# Patient Record
Sex: MALE | Race: WHITE | URBAN - METROPOLITAN AREA
[De-identification: names, ages, dates, MRNs, and addresses within clinical notes are randomized per-mention and may not be internally consistent; named-entity substitution may affect disease eponyms.]

---

## 2020-09-28 ENCOUNTER — OFFICE VISIT (OUTPATIENT)
Dept: FAMILY MEDICINE CLINIC | Age: 65
End: 2020-09-28
Payer: COMMERCIAL

## 2020-09-28 VITALS
OXYGEN SATURATION: 97 % | SYSTOLIC BLOOD PRESSURE: 144 MMHG | HEART RATE: 85 BPM | WEIGHT: 229.8 LBS | BODY MASS INDEX: 30.46 KG/M2 | HEIGHT: 73 IN | TEMPERATURE: 97.1 F | DIASTOLIC BLOOD PRESSURE: 98 MMHG

## 2020-09-28 PROBLEM — Z23 NEED FOR PNEUMOCOCCAL VACCINATION: Status: ACTIVE | Noted: 2020-09-28

## 2020-09-28 PROBLEM — Z23 NEED FOR INFLUENZA VACCINATION: Status: ACTIVE | Noted: 2020-09-28

## 2020-09-28 PROBLEM — I20.89 STABLE ANGINA: Status: ACTIVE | Noted: 2020-09-28

## 2020-09-28 PROBLEM — M72.0 DUPUYTREN'S CONTRACTURE: Status: ACTIVE | Noted: 2020-09-28

## 2020-09-28 PROBLEM — I10 ESSENTIAL HYPERTENSION: Status: ACTIVE | Noted: 2020-09-28

## 2020-09-28 PROBLEM — N52.9 ERECTILE DYSFUNCTION: Status: ACTIVE | Noted: 2020-09-28

## 2020-09-28 PROBLEM — L30.9 ECZEMA: Status: ACTIVE | Noted: 2020-09-28

## 2020-09-28 PROBLEM — I20.8 STABLE ANGINA (HCC): Status: ACTIVE | Noted: 2020-09-28

## 2020-09-28 PROBLEM — Z95.5 HISTORY OF HEART ARTERY STENT: Status: ACTIVE | Noted: 2020-09-28

## 2020-09-28 PROCEDURE — G0009 ADMIN PNEUMOCOCCAL VACCINE: HCPCS | Performed by: NURSE PRACTITIONER

## 2020-09-28 PROCEDURE — G8417 CALC BMI ABV UP PARAM F/U: HCPCS | Performed by: NURSE PRACTITIONER

## 2020-09-28 PROCEDURE — 1123F ACP DISCUSS/DSCN MKR DOCD: CPT | Performed by: NURSE PRACTITIONER

## 2020-09-28 PROCEDURE — 99204 OFFICE O/P NEW MOD 45 MIN: CPT | Performed by: NURSE PRACTITIONER

## 2020-09-28 PROCEDURE — 3017F COLORECTAL CA SCREEN DOC REV: CPT | Performed by: NURSE PRACTITIONER

## 2020-09-28 PROCEDURE — G0008 ADMIN INFLUENZA VIRUS VAC: HCPCS | Performed by: NURSE PRACTITIONER

## 2020-09-28 PROCEDURE — 4040F PNEUMOC VAC/ADMIN/RCVD: CPT | Performed by: NURSE PRACTITIONER

## 2020-09-28 PROCEDURE — G8427 DOCREV CUR MEDS BY ELIG CLIN: HCPCS | Performed by: NURSE PRACTITIONER

## 2020-09-28 PROCEDURE — 90694 VACC AIIV4 NO PRSRV 0.5ML IM: CPT | Performed by: NURSE PRACTITIONER

## 2020-09-28 PROCEDURE — 1036F TOBACCO NON-USER: CPT | Performed by: NURSE PRACTITIONER

## 2020-09-28 PROCEDURE — 90732 PPSV23 VACC 2 YRS+ SUBQ/IM: CPT | Performed by: NURSE PRACTITIONER

## 2020-09-28 RX ORDER — NITROGLYCERIN 0.3 MG/1
0.3 TABLET SUBLINGUAL EVERY 5 MIN PRN
COMMUNITY

## 2020-09-28 RX ORDER — LISINOPRIL 20 MG/1
20 TABLET ORAL DAILY
Qty: 90 TABLET | Refills: 0 | Status: SHIPPED | OUTPATIENT
Start: 2020-09-28 | End: 2020-12-28

## 2020-09-28 RX ORDER — ASPIRIN 81 MG/1
81 TABLET ORAL DAILY
COMMUNITY

## 2020-09-28 RX ORDER — LISINOPRIL 20 MG/1
20 TABLET ORAL DAILY
COMMUNITY
End: 2020-09-28 | Stop reason: SDUPTHER

## 2020-09-28 RX ORDER — SILDENAFIL CITRATE 20 MG/1
20 TABLET ORAL PRN
COMMUNITY

## 2020-09-28 SDOH — HEALTH STABILITY: MENTAL HEALTH: HOW MANY STANDARD DRINKS CONTAINING ALCOHOL DO YOU HAVE ON A TYPICAL DAY?: 1 OR 2

## 2020-09-28 SDOH — HEALTH STABILITY: MENTAL HEALTH: HOW OFTEN DO YOU HAVE A DRINK CONTAINING ALCOHOL?: 2-3 TIMES A WEEK

## 2020-09-28 ASSESSMENT — PATIENT HEALTH QUESTIONNAIRE - PHQ9
2. FEELING DOWN, DEPRESSED OR HOPELESS: 0
SUM OF ALL RESPONSES TO PHQ QUESTIONS 1-9: 0
1. LITTLE INTEREST OR PLEASURE IN DOING THINGS: 0
SUM OF ALL RESPONSES TO PHQ QUESTIONS 1-9: 0
SUM OF ALL RESPONSES TO PHQ9 QUESTIONS 1 & 2: 0

## 2020-09-28 ASSESSMENT — ENCOUNTER SYMPTOMS
VOMITING: 0
NAUSEA: 0
BLOOD IN STOOL: 0
ROS SKIN COMMENTS: ECZEMA
BACK PAIN: 0
SHORTNESS OF BREATH: 0
CONSTIPATION: 0
ABDOMINAL PAIN: 0
DIARRHEA: 0
COUGH: 0

## 2020-09-28 NOTE — PROGRESS NOTES
2020     Jennifer Gómez (:  1955) is a 77 y.o. male, here for evaluation of the following medical concerns:      Presents to establish care. Hypertension-taking lisinopril Zestril. Blood pressure borderline 140/90 at home    In  he had 99% blockage of left main artery and had a heart stent placed. Has not had any issues since, but states he   Gets chest pains once per week with or without exertion. Lasts 30 secs at the most and resolves on its own. States his previous cardiologist was aware of this. Does not have a cardiologist here in [facility] yet. Has nitro if he needs it at home. No dizziness, dyspnea, arm pain, palpitations, headaches, back pain. Does not take statins due to muscle cramping and pain    Erectile dysfunction, taking sildenafil without side effects    dupuytren contracture of right hand, chronic and unchanging  Still has range of motion    Diagnosis of eczema. Reports a red itchy rash on his chest that he has had for years. Uses triamcinolone, which helps. Colonoscopy normal within the last 10 years  PSA normal within the last 5 years  Needs a pneumonia vaccine and flu vaccine    He thinks that he wants to be a DNR CC and wants paperwork filled out today        Review of Systems   Constitutional: Negative for chills, diaphoresis, fatigue and fever. Eyes: Negative for visual disturbance. Respiratory: Negative for cough and shortness of breath. Cardiovascular: Positive for chest pain (once per week. ). Negative for palpitations and leg swelling. Gastrointestinal: Negative for abdominal pain, blood in stool, constipation, diarrhea, nausea and vomiting. Genitourinary: Negative for difficulty urinating. Musculoskeletal: Positive for arthralgias. Negative for back pain and gait problem. Dupuytren right hand    Skin:        eczema   Neurological: Negative for dizziness, weakness, light-headedness, numbness and headaches.    Psychiatric/Behavioral: Negative for decreased concentration, sleep disturbance and suicidal ideas. The patient is not nervous/anxious. Prior to Visit Medications    Medication Sig Taking? Authorizing Provider   sildenafil (REVATIO) 20 MG tablet Take 20 mg by mouth as needed Yes Historical Provider, MD   aspirin 81 MG EC tablet Take 81 mg by mouth daily Yes Historical Provider, MD   triamcinolone (KENALOG) 0.1 % ointment Apply topically 2 times daily Apply topically 2 times daily. Yes Historical Provider, MD   lisinopril (PRINIVIL;ZESTRIL) 20 MG tablet Take 1 tablet by mouth daily Yes Eleni Michelle APRN - NP        Social History     Tobacco Use    Smoking status: Never Smoker    Smokeless tobacco: Never Used   Substance Use Topics    Alcohol use: Yes     Alcohol/week: 1.0 standard drinks     Types: 1 Glasses of wine per week     Frequency: 2-3 times a week     Drinks per session: 1 or 2     Comment: one glass of wine per night         Vitals:    09/28/20 0927   BP: (!) 144/98   Site: Left Upper Arm   Position: Sitting   Cuff Size: Large Adult   Pulse: 85   Temp: 97.1 °F (36.2 °C)   SpO2: 97%   Weight: 229 lb 12.8 oz (104.2 kg)   Height: 6' 1\" (1.854 m)     Estimated body mass index is 30.32 kg/m² as calculated from the following:    Height as of this encounter: 6' 1\" (1.854 m). Weight as of this encounter: 229 lb 12.8 oz (104.2 kg). Physical Exam  Vitals signs reviewed. Constitutional:       General: He is not in acute distress. Appearance: Normal appearance. He is not ill-appearing, toxic-appearing or diaphoretic. HENT:      Head: Normocephalic and atraumatic. Nose: Nose normal.   Eyes:      Extraocular Movements: Extraocular movements intact. Pupils: Pupils are equal, round, and reactive to light. Neck:      Musculoskeletal: Normal range of motion and neck supple. Cardiovascular:      Rate and Rhythm: Normal rate and regular rhythm. Heart sounds: Normal heart sounds.    Pulmonary: Effort: Pulmonary effort is normal. No respiratory distress. Breath sounds: Normal breath sounds. Abdominal:      General: Bowel sounds are normal. There is no distension. Palpations: Abdomen is soft. There is no mass. Tenderness: There is no abdominal tenderness. Hernia: No hernia is present. Musculoskeletal: Normal range of motion. Skin:     General: Skin is warm and dry. Neurological:      General: No focal deficit present. Mental Status: He is alert and oriented to person, place, and time. Mental status is at baseline. Motor: No weakness. Gait: Gait normal.   Psychiatric:         Mood and Affect: Mood normal.         Behavior: Behavior normal.         Thought Content: Thought content normal.         Judgment: Judgment normal.         ASSESSMENT/PLAN:  1. Essential hypertension  Continue current medications. He states blood pressure at home is mid 130s to low 140s. Discussed checking his blood pressure 1 to 2 hours after medication daily and calling next week to follow-up with BP readings. May need to increase blood pressure medicine. DASH diet   - Comprehensive Metabolic Panel; [facility]  [facility]    2. History of heart artery stent  Allergy to statins and metoprolol  [facility]    3. Stable angina [facility])  He states this is minor and he is not concerned. Previous cardiologist is aware of this. I advised him it would be a good idea to have a cardiologist here in [facility]. [facility]    4. Erectile dysfunction, unspecified erectile dysfunction type  Sildenafil as needed. Advised of side effects, low blood pressure. 5. Dupuytren's contracture  Continue to monitor. No current intervention. ROM exercises    6. Eczema, unspecified type  Continue triamcinolone cream as needed.   Advised to apply emollient lotions as soon as he gets out of the shower before he is dry. Avoid drying agents such as soap and hot water, bleach, sun, chlorine    7. Need for influenza vaccination  Vaccine administered  8. Need for pneumococcal vaccination  Vaccine administered    9. Mixed hyperlipidemia  Is unable to take statins due to muscle cramps and pain. Will discuss cholesterol medication after lipid panel results and have cardiology input.  - Lipid Panel; Future  - Dong Shepard MD, Cardiology, [facility]        Patient education given on DNR status. He and his wife will review and return to office with their decision and paperwork to be completed      Present to the ER for any emergent or acute symptoms not managed at home or in office. Please note that this chart was generated using dragon dictation software. Although every effort was made to ensure the accuracy of this automated transcription, some errors in transcription may have occurred. Return in about 6 months (around 3/28/2021) for htn, htn check. An electronic signature was used to authenticate this note.     --AURELIO Zaidi - NP on 9/28/2020 at 5:37 PM

## 2020-10-01 DIAGNOSIS — E78.2 MIXED HYPERLIPIDEMIA: ICD-10-CM

## 2020-10-01 DIAGNOSIS — I10 ESSENTIAL HYPERTENSION: ICD-10-CM

## 2020-10-01 LAB
A/G RATIO: 1.7 (ref 1.1–2.2)
ALBUMIN SERPL-MCNC: 4.2 G/DL (ref 3.4–5)
ALP BLD-CCNC: 84 U/L (ref 40–129)
ALT SERPL-CCNC: 23 U/L (ref 10–40)
ANION GAP SERPL CALCULATED.3IONS-SCNC: 12 MMOL/L (ref 3–16)
AST SERPL-CCNC: 21 U/L (ref 15–37)
BILIRUB SERPL-MCNC: 0.5 MG/DL (ref 0–1)
BUN BLDV-MCNC: 12 MG/DL (ref 7–20)
CALCIUM SERPL-MCNC: 9 MG/DL (ref 8.3–10.6)
CHLORIDE BLD-SCNC: 104 MMOL/L (ref 99–110)
CHOLESTEROL, TOTAL: 220 MG/DL (ref 0–199)
CO2: 26 MMOL/L (ref 21–32)
CREAT SERPL-MCNC: 0.9 MG/DL (ref 0.8–1.3)
GFR AFRICAN AMERICAN: >60
GFR NON-AFRICAN AMERICAN: >60
GLOBULIN: 2.5 G/DL
GLUCOSE BLD-MCNC: 94 MG/DL (ref 70–99)
HDLC SERPL-MCNC: 34 MG/DL (ref 40–60)
LDL CHOLESTEROL CALCULATED: 139 MG/DL
POTASSIUM SERPL-SCNC: 4.1 MMOL/L (ref 3.5–5.1)
SODIUM BLD-SCNC: 142 MMOL/L (ref 136–145)
TOTAL PROTEIN: 6.7 G/DL (ref 6.4–8.2)
TRIGL SERPL-MCNC: 233 MG/DL (ref 0–150)
VLDLC SERPL CALC-MCNC: 47 MG/DL

## 2020-10-08 ENCOUNTER — INITIAL CONSULT (OUTPATIENT)
Dept: CARDIOLOGY CLINIC | Age: 65
End: 2020-10-08
Payer: COMMERCIAL

## 2020-10-08 ENCOUNTER — TELEPHONE (OUTPATIENT)
Dept: CARDIOLOGY CLINIC | Age: 65
End: 2020-10-08

## 2020-10-08 ENCOUNTER — PROCEDURE VISIT (OUTPATIENT)
Dept: CARDIOLOGY CLINIC | Age: 65
End: 2020-10-08
Payer: COMMERCIAL

## 2020-10-08 VITALS
WEIGHT: 230.8 LBS | HEIGHT: 73 IN | SYSTOLIC BLOOD PRESSURE: 124 MMHG | HEART RATE: 73 BPM | BODY MASS INDEX: 30.59 KG/M2 | DIASTOLIC BLOOD PRESSURE: 82 MMHG

## 2020-10-08 PROBLEM — I25.119 CORONARY ARTERY DISEASE INVOLVING NATIVE CORONARY ARTERY OF NATIVE HEART WITH ANGINA PECTORIS (HCC): Status: ACTIVE | Noted: 2020-10-08

## 2020-10-08 PROBLEM — E78.5 DYSLIPIDEMIA: Status: ACTIVE | Noted: 2020-10-08

## 2020-10-08 PROBLEM — E66.09 CLASS 1 OBESITY DUE TO EXCESS CALORIES WITH SERIOUS COMORBIDITY IN ADULT: Status: ACTIVE | Noted: 2020-10-08

## 2020-10-08 LAB
LV EF: 48 %
LV EF: 58 %
LVEF MODALITY: NORMAL
LVEF MODALITY: NORMAL

## 2020-10-08 PROCEDURE — 1123F ACP DISCUSS/DSCN MKR DOCD: CPT | Performed by: INTERNAL MEDICINE

## 2020-10-08 PROCEDURE — A9500 TC99M SESTAMIBI: HCPCS | Performed by: INTERNAL MEDICINE

## 2020-10-08 PROCEDURE — 93015 CV STRESS TEST SUPVJ I&R: CPT | Performed by: INTERNAL MEDICINE

## 2020-10-08 PROCEDURE — 93000 ELECTROCARDIOGRAM COMPLETE: CPT | Performed by: INTERNAL MEDICINE

## 2020-10-08 PROCEDURE — 99204 OFFICE O/P NEW MOD 45 MIN: CPT | Performed by: INTERNAL MEDICINE

## 2020-10-08 PROCEDURE — G8417 CALC BMI ABV UP PARAM F/U: HCPCS | Performed by: INTERNAL MEDICINE

## 2020-10-08 PROCEDURE — 1036F TOBACCO NON-USER: CPT | Performed by: INTERNAL MEDICINE

## 2020-10-08 PROCEDURE — 78452 HT MUSCLE IMAGE SPECT MULT: CPT | Performed by: INTERNAL MEDICINE

## 2020-10-08 PROCEDURE — G8427 DOCREV CUR MEDS BY ELIG CLIN: HCPCS | Performed by: INTERNAL MEDICINE

## 2020-10-08 PROCEDURE — 4040F PNEUMOC VAC/ADMIN/RCVD: CPT | Performed by: INTERNAL MEDICINE

## 2020-10-08 PROCEDURE — 93306 TTE W/DOPPLER COMPLETE: CPT | Performed by: INTERNAL MEDICINE

## 2020-10-08 PROCEDURE — G8484 FLU IMMUNIZE NO ADMIN: HCPCS | Performed by: INTERNAL MEDICINE

## 2020-10-08 PROCEDURE — 3017F COLORECTAL CA SCREEN DOC REV: CPT | Performed by: INTERNAL MEDICINE

## 2020-10-08 NOTE — PROGRESS NOTES
CARDIAC CONSULT NOTE       Reason for consultation: Rosario Ferrer is a 77 y.o. male who had concerns including Edema; Chest Pain; and New Patient. .    Chief Complaint   Patient presents with    Edema    Chest Pain    New Patient       Referring physician:  AURELIO James NP    Primary care physician:  AURELIO James NP    History of Present Illness:   76 Y/O white male with known H/O CAD, moved from [place]. Seeking a new cardiologist for F/U. C/O Chest Pain & Leg edema. CAD s/p coronary stenting May of 2015. Patient had presented with Syncopal & near Syncopal episodes but no chest pain. Patient's wife says it was Left Main problem ? Valeriano Jareth Patient does not tolerate statins & he has not been on any Lipid lowering agent. Patient exercise regularly. Now C/O Left lower rib cage chest pain. has a past medical history of ED (erectile dysfunction), Hyperlipidemia, and Hypertension. CAD s/p coronary stenting May of 2015.       has a past surgical history that includes Cardiac surgery; Leg Surgery (11/2001); and knee surgery. reports that he has never smoked. He has never used smokeless tobacco. He reports current alcohol use of about 1.0 standard drinks of alcohol per week. He reports that he does not use drugs. As Reviewed family history includes Diabetes in his maternal grandfather, maternal grandmother, mother, and sister; Heart Attack in his father and paternal grandfather; Stroke in his mother and sister. Review of Systems:   1. Constitutional: No Fever or Weight Loss  2. Eyes: No Decreased Vision  3. ENT: No Headaches, Hearing Loss or Vertigo  4. Cardiovascular: No chest pain, dyspnea on exertion, palpitations or loss of consciousness  5. Respiratory: No cough or wheezing    6. Gastrointestinal: No abdominal pain, appetite loss, blood in stools, constipation, diarrhea or heartburn  7.  Genitourinary: No dysuria, trouble voiding, or hematuria  8. Musculoskeletal:  No gait disturbance, weakness or joint complaints  9. Integumentary: No rash or pruritis  10. Neurological: No TIA or stroke symptoms  11. Psychiatric: No anxiety or depression  12. Endocrine: No malaise, fatigue or temperature intolerance  13. Hematologic/Lymphatic: No bleeding problems, blood clots or swollen lymph nodes  14. Allergic/Immunologic: No nasal congestion or hives    Physical Examination:    /82   Pulse 73   Ht 6' 1\" (1.854 m)   Wt 230 lb 12.8 oz (104.7 kg)   BMI 30.45 kg/m²    Wt Readings from Last 3 Encounters:   10/08/20 230 lb 12.8 oz (104.7 kg)   09/28/20 229 lb 12.8 oz (104.2 kg)     Body mass index is 30.45 kg/m². General Appearance:  Non-obese/Well Nourished  1. Skin: It is warm & dry. No rashes noted. 2. Eyes: No conjunctival Pallor seen. No jaundice noted. 3. HEENT: atraumatic / normocephalic. EOMI. Neck is supple there is no elevation of JVD. No thyromegaly  4. Respiratory:  · Resp Assessment: Normal  · Resp Auscultation: Normal breath sounds without dullness  5. Cardiovascular:  · Auscultation: Normal  · Carotid Arteries: Normal  · Abdominal Aorta: Normal   · Pedal Pulses: 2+ and equal   6. Abdomen:  · No masses or tenderness  · Liver/Spleen: No Abnormalities Noted, no organomegaly. 7. Musculoskeletal: No joint deformities. No muscle wasting  8. Extremities:  ·  No Cyanosis or Clubbing. No significant edema   9. Rectal / genital: deferred.   10. Neurological/Psychiatric:  · Oriented to time, place, and person  · Non-anxious    No results found for: CKTOTAL, CKMB, CKMBINDEX, TROPONINT  BNP:  No results found for: BNP, PROBNP  PT/INR:  No results found for: PTINR  No results found for: LABA1C  Lab Results   Component Value Date    CHOL 220 (H) 10/01/2020    TRIG 233 (H) 10/01/2020    HDL 34 (L) 10/01/2020    LDLCALC 139 (H) 10/01/2020     Lab Results   Component Value Date    ALT 23 10/01/2020    AST 21 10/01/2020     BMP:    Lab Results Component Value Date     10/01/2020    K 4.1 10/01/2020     10/01/2020    CO2 26 10/01/2020    BUN 12 10/01/2020    CREATININE 0.9 10/01/2020     CMP:    Lab Results   Component Value Date     10/01/2020    K 4.1 10/01/2020     10/01/2020    CO2 26 10/01/2020    BUN 12 10/01/2020    CREATININE 0.9 10/01/2020    PROT 6.7 10/01/2020     TSH:  No results found for: TSH, TSHHS    Echo: not done  Stress Cardiolyte: not done  EKG: normal sinus rhythm, nonspecific ST and T waves changes    Assessment:   Patient Active Problem List   Diagnosis Code    Essential hypertension I10    History of heart artery stent Z95.5    Stable angina ([facility] [address] I20.8    Erectile dysfunction N52.9    Dupuytren's contracture M72.0    Eczema L30.9    Need for influenza vaccination Z23    Need for pneumococcal vaccination Z23    Dyslipidemia E78.5    Class 1 obesity due to excess calories with serious comorbidity in adult E66.09    Coronary artery disease involving native coronary artery of native heart with angina pectoris ([facility] [address] I25.119     Patient Active Problem List:     Essential hypertension     History of heart artery stent     Stable angina (HCC)     Erectile dysfunction     Dupuytren's contracture     Eczema     Need for influenza vaccination     Need for pneumococcal vaccination      QUALITY MEASURES REVIEWED:  1.CAD:Patient is taking anti platelet agent:Yes  2. DYSLIPIDEMIA: Patient is on cholesterol lowering medication:No  3. Beta-Blocker therapy for CAD, if prior Myocardial Infarction:No  4. Counselled regarding smoking cessation. 5. Atrial fibrillation & anticoagulation therapy No  6. Discussed weight management strategies. Recommendations:   CAD: Patient has silent Ischemia S/P ? LM stenting. Not on Lipid lowering agents due to Statin intolerance. Will suggest patient take PCSK9 inhibitor.             Will check Echo & Stress tests for F/U   Dyslipidemia: as above    If testing is OK OV in 6 months if testing is OK.        Mica Hinton MD, 10/8/2020 10:17 AM

## 2020-10-08 NOTE — PATIENT INSTRUCTIONS
CAD: Patient has silent Ischemia S/P ? LM stenting. Not on Lipid lowering agents due to Statin intolerance. Will suggest patient take PCSK9 inhibitor. Will check Echo & Stress tests for F/U   Dyslipidemia: as above    If testing is OK OV in 6 months if testing is OK. Please hold on to these instructions the  will call you within 1-9 business days when we receive authorization from your insurance. Nuclear Stress Test    WHAT TO EXPECT:   ? You will need to confirm the test or it could be cancelled. ? This test will take approximately 2 hours: 1 hour in the AM &    1 hour in the PM. You will be given a time by the Technologist after the first part is completed to come back. ? You will be given a medication, through an IV in the hand, this will safely simulate exercise. This IV is also needed to inject the radioactive isotope unless you are able toe walk the treadmill. ? You will receive an injection in the AM & PM before the pictures. ? Using a special camera, you will have one set of pictures of your heart taken in the AM and a set of pictures in the PM.     PREPARATION FOR TEST:  ? Eat a light breakfast such as water or juice and toast.  ? If you are DIABETIC: Eat a normal breakfast with NO CAFFEINE and take your insulin as normal.   ? AVOID ALL FOODS & DRINKS containing CAFFEINE 12 HOURS PRIOR TO THE TEST: Including coffee, Tea, Hernan and other soft drinks even those labeled  caffeine free or decaffeinated.  ? HOLD THESE MEDICATIONS Persantine & Theophylline (Theodur)  24 hours prior & bring your inhaler with you. The physician will specify if the following Beta blockers need to be held for 24 hours prior to test:     Please hold on to these instructions the  will call you within 1-9 business days when we receive authorization from your insurance. Echocardiogram    WHAT TO EXPECT:   ? This test will take approximately 45 minutes.   ? It is an ultrasound of the heart. ? It can look at the valves and chambers inside the heart   ? There is no special instructions for this test.     If you are unable to keep this appointment, please notify us 24 hours prior to test at [phone].

## 2020-10-13 ENCOUNTER — TELEPHONE (OUTPATIENT)
Dept: CARDIOLOGY CLINIC | Age: 65
End: 2020-10-13

## 2020-10-13 NOTE — TELEPHONE ENCOUNTER
Advised patient of results. Patient voiced understanding. Fair exercise tolerance. 7 METs work load. Physiological BP response to exercise. ETT non diagnostic due to baseline EKG abnormalities. Normal perfusion study with normal distribution in all coronal, short, and horizontal axis. Low normal LV function> LVEF is 48 %. Supervising physician Dr. Deanna De Anda . Recommendation: Routine follow-up. Normal left ventricle structure and systolic function with an ejection   fraction of 55-60%. Grade I diastolic dysfunction. Sclerotic, but non-stenotic aortic valve. Normal pulmonary artery pressure. No evidence of pericardial effusion.    Essentially unremarkable echo

## 2020-10-14 ENCOUNTER — TELEPHONE (OUTPATIENT)
Dept: CARDIOLOGY CLINIC | Age: 65
End: 2020-10-14

## 2020-10-14 NOTE — TELEPHONE ENCOUNTER
From the patient :I want to inform you that I no longer want you as my Cardiologist.  After our initial meeting, in which you seemed to have an agenda for prescribing needless drugs, I am no longer interested in having you as my heart doctor. I wish you well in your future endeavors.

## 2020-10-21 NOTE — TELEPHONE ENCOUNTER
Cancelled PA request as patient sent note as follows per mark:    I want to inform you that I no longer want you as my Cardiologist. Kee Martin our initial meeting, in which you seemed to have an agenda for prescribing needless drugs, I am no longer interested in having you as my heart doctor.  I wish you well in your future endeavors.

## 2020-10-28 PROBLEM — Z23 NEED FOR INFLUENZA VACCINATION: Status: RESOLVED | Noted: 2020-09-28 | Resolved: 2020-10-28

## 2020-12-28 RX ORDER — LISINOPRIL 20 MG/1
TABLET ORAL
Qty: 90 TABLET | Refills: 0 | Status: SHIPPED | OUTPATIENT
Start: 2020-12-28

## 2021-10-24 RX ORDER — LISINOPRIL 20 MG/1
TABLET ORAL
Qty: 90 TABLET | Refills: 0 | OUTPATIENT
Start: 2021-10-24